# Patient Record
(demographics unavailable — no encounter records)

---

## 2025-06-13 NOTE — END OF VISIT
[FreeTextEntry4] : This note was written by Carmen Howard on 06/13/2025 actively solely Heath Bates M.D. I, Carmen Howard, am scribing for and in the presence of Heath Bates M.D. in the following sections HISTORY OF PRESENT ILLNESS, PAST MEDICAL/FAMILY/SOCIAL HISTORY; REVIEW OF SYSTEMS; VITAL SIGNS; PHYSICAL EXAM; ASSESSMENT/PLAN. All medical record entries made by this scribe at , Heath Bates M.D. direction and personally dictated by me on 06/13/2025. I personally performed the services described in the documentation, reviewed the documentation recorded by the scribe in my presence, and it accurately and completely records my words and actions.

## 2025-06-13 NOTE — HISTORY OF PRESENT ILLNESS
[FreeTextEntry1] : 06/13/2025 cc possible UTI Pt is a 75 year old female presenting for a new patient visit with possible UTI. Pt reports that since her colitis diagnosis in January 2025 she has had infections. Pt reports that she was due to have surgery June 15th but she has postponed it because she thinks she may have an infection. Pt denies being diagnosed by a medical professional. Pt reports her symptoms include nausea and discomfort in vagina which worsens at night. Pt denies frequency, dysuria, or hematuria. Pt reports symptoms started on Tuesday 6/10.   PMHx :afib, tumor  PSHx: tumor removal